# Patient Record
Sex: FEMALE | Race: WHITE
[De-identification: names, ages, dates, MRNs, and addresses within clinical notes are randomized per-mention and may not be internally consistent; named-entity substitution may affect disease eponyms.]

---

## 2019-07-12 ENCOUNTER — HOSPITAL ENCOUNTER (EMERGENCY)
Dept: HOSPITAL 76 - ED | Age: 34
Discharge: HOME | End: 2019-07-12
Payer: COMMERCIAL

## 2019-07-12 VITALS — DIASTOLIC BLOOD PRESSURE: 87 MMHG | SYSTOLIC BLOOD PRESSURE: 136 MMHG

## 2019-07-12 DIAGNOSIS — S46.011A: Primary | ICD-10-CM
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
## 2019-07-12 PROCEDURE — 99283 EMERGENCY DEPT VISIT LOW MDM: CPT

## 2019-07-12 PROCEDURE — 96372 THER/PROPH/DIAG INJ SC/IM: CPT

## 2019-07-12 NOTE — XRAY REPORT
Reason:  right shoulder pain.

Procedure Date:  07/12/2019   

Accession Number:  [id number] / [id number]                    

Procedure:  XR  - Shoulder 3 View RT CPT Code:  

 

FULL RESULT:

 

 

EXAM:

RIGHT SHOULDER RADIOGRAPHY

 

EXAM DATE: 7/12/2019 12:10 PM.

 

CLINICAL HISTORY: Right shoulder pain. History of rotator cuff injury.

 

COMPARISON: None available.

 

TECHNIQUE: 3 views.

 

FINDINGS:

Bones: Normal. No fracture or bone lesion.

 

Joints: The glenohumeral and acromioclavicular joints are normal.

 

Soft tissues: The visualized hemithorax is unremarkable. No soft tissue 

swelling.

IMPRESSION: Negative right shoulder.

 

[facility]

## 2019-07-12 NOTE — ED PHYSICIAN DOCUMENTATION
PD HPI UPPER EXT INJURY





- Stated complaint


Stated Complaint: R SHOULDER PX





- Chief complaint


Chief Complaint: Ext Problem





- History obtained from


History obtained from: Patient





- History of Present Illness


Location: Right, Shoulder


Timing - onset: How many weeks ago (1)


Worsened by: Moving


Similar symptoms before: Diagnosis (Rotator cuff tear)





- Additonal information


Additional information: 


The patient is a 34-year-old female with a history of right rotator cuff tear, 

who presents with right shoulder pain that started 1 week ago when she was 

painting over her head.  She is right-hand dominant.  She has had similar 

symptoms intermittently for the past 2 years.  She denies neck pain, numbness or

weakness.








Review of Systems


Constitutional: denies: Fever


Nose: denies: Congestion


Throat: denies: Sore throat


Cardiac: denies: Chest pain / pressure


Respiratory: denies: Dyspnea, Cough


Skin: denies: Rash


Musculoskeletal: reports: Joint pain (right shoulder).  denies: Neck pain


Neurologic: denies: Focal weakness, Numbness





PD PAST MEDICAL HISTORY





- Past Medical History


Cardiovascular: None


Respiratory: None


Neuro: None


Endocrine/Autoimmune: None


GI: None


GYN: None


: None


HEENT: None


Psych: None


Musculoskeletal: Other (right rotator cuff tear)


Derm: None





- Past Surgical History


Past Surgical History: No





- Present Medications


Home Medications: 


                                Ambulatory Orders











 Medication  Instructions  Recorded  Confirmed


 


Ibuprofen 600 mg PO Q6HR PRN #30 tablet 01/17/16 


 


predniSONE [Deltasone] 40 mg PO DAILY 5 Days  tablet 01/17/16 


 


traMADol [Ultram] 50 mg PO Q4-6H PRN #15 tablet 01/17/16 


 


Tramadol HCl 50 mg PO Q6HR PRN #20 tablet 11/27/18 


 


Ibuprofen [Ibu] 800 mg PO BID #30 tablet 07/12/19 














- Allergies


Allergies/Adverse Reactions: 


                                    Allergies











Allergy/AdvReac Type Severity Reaction Status Date / Time


 


clarithromycin [From Biaxin] Allergy  Unknown Verified 07/12/19 11:40


 


Penicillins Allergy  Unknown Verified 07/12/19 11:40


 


morphine AdvReac  Unknown Verified 07/12/19 11:40














- Social History


Does the pt smoke?: No


Smoking Status: Never smoker


Does the pt drink ETOH?: No


Does the pt have substance abuse?: No





- Immunizations


Immunizations are current?: Yes





- POLST


Patient has POLST: No





PD ED PE NORMAL





- Vitals


Vital signs reviewed: Yes (normal)





- General


General: Alert and oriented X 3, Well developed/nourished





- HEENT


HEENT: Atraumatic





- Neck


Neck: No bony TTP





- Cardiac


Cardiac: RRR





- Respiratory


Respiratory: No respiratory distress





- Back


Back: No spinal TTP





- Derm


Derm: No rash





- Extremities


Extremities: Other (There is tenderness to palpation over the posterior superior

aspect of the right shoulder just posterior to the acromioclavicular joint.  The

pain is exacerbated with elevation of her right arm.  She tolerates passive 

range of motion better than active range of motion.  Distal neurovascular is 

intact.)





- Neuro


Neuro: Alert and oriented X 3, No motor deficit, No sensory deficit





Results





- Vitals


Vitals: 


                               Vital Signs - 24 hr











  07/12/19





  11:37


 


Temperature 36.4 C L


 


Heart Rate 70


 


Respiratory 16





Rate 


 


Blood Pressure 136/87 H


 


O2 Saturation 96








                                     Oxygen











O2 Source                      Room air

















- Rads (name of study)


  ** Right shoulder


Radiology: Prelim report reviewed, EMP read contemporaneously, See rad report 

(Normal right shoulder radiography.)





PD MEDICAL DECISION MAKING





- ED course


Complexity details: reviewed results, re-evaluated patient, considered 

differential, d/w patient


ED course: 


The patient's presentation is most consistent with right rotator cuff tear.  X-

ray of the right shoulder reveals no acute bony abnormality.


Treatment in the emergency department included administration of Toradol 60 mg 

IM and application of a right arm sling.  She is being discharged with 

prescription for ibuprofen.  I discussed with her symptomatic treatment, 

orthopedic follow-up, as well as potentially worrisome signs or symptoms that 

should prompt reevaluation in the emergency department.








Departure





- Departure


Disposition: 01 Home, Self Care


Clinical Impression: 


Right rotator cuff tear


Qualifiers:


 Rotator cuff tear extent: unspecified tear extent Rotator cuff tear trauma 

status: traumatic Encounter type: initial encounter Qualified Code(s): S46.011A 

- Strain of muscle(s) and tendon(s) of the rotator cuff of right shoulder, 

initial encounter





Condition: Stable


Instructions:  ED Torn Rotator Cuff


Follow-Up: 


[facility] [place] [Provider Group]


[facility] [Provider Group]


Prescriptions: 


Ibuprofen [Ibu] 800 mg PO BID #30 tablet


Comments: 


Apply ice pack to your right shoulder intermittently for the next 4 days.


Use the sling for comfort.


You can use ibuprofen, up to 800 mg 3 times daily for anti-inflammatory effect.


Follow-up with your primary physician within 1 week.  Call to schedule an 

appointment.


Return to the emergency department if you develop increasing pain, numbness or 

weakness, or otherwise worsening symptoms.


Forms:  Activity restrictions


Discharge Date/Time: 07/12/19 12:55

## 2019-07-13 ENCOUNTER — HOSPITAL ENCOUNTER (EMERGENCY)
Dept: HOSPITAL 76 - ED | Age: 34
Discharge: HOME | End: 2019-07-13
Payer: COMMERCIAL

## 2019-07-13 VITALS — SYSTOLIC BLOOD PRESSURE: 136 MMHG | DIASTOLIC BLOOD PRESSURE: 83 MMHG

## 2019-07-13 DIAGNOSIS — M75.101: Primary | ICD-10-CM

## 2019-07-13 PROCEDURE — 99283 EMERGENCY DEPT VISIT LOW MDM: CPT

## 2019-07-13 PROCEDURE — 96372 THER/PROPH/DIAG INJ SC/IM: CPT

## 2019-09-19 ENCOUNTER — HOSPITAL ENCOUNTER (OUTPATIENT)
Dept: HOSPITAL 76 - DI | Age: 34
Discharge: HOME | End: 2019-09-19
Attending: GENERAL PRACTICE
Payer: COMMERCIAL

## 2019-09-19 DIAGNOSIS — R20.2: ICD-10-CM

## 2019-09-19 DIAGNOSIS — M25.511: Primary | ICD-10-CM

## 2019-09-19 PROCEDURE — 72141 MRI NECK SPINE W/O DYE: CPT

## 2019-09-20 ENCOUNTER — HOSPITAL ENCOUNTER (OUTPATIENT)
Dept: HOSPITAL 76 - DI | Age: 34
Discharge: HOME | End: 2019-09-20
Attending: GENERAL PRACTICE
Payer: COMMERCIAL

## 2019-09-20 DIAGNOSIS — M75.101: ICD-10-CM

## 2019-09-20 DIAGNOSIS — M75.51: Primary | ICD-10-CM

## 2019-09-20 PROCEDURE — 23350 INJECTION FOR SHOULDER X-RAY: CPT

## 2019-09-20 PROCEDURE — 77002 NEEDLE LOCALIZATION BY XRAY: CPT

## 2019-09-20 PROCEDURE — 73222 MRI JOINT UPR EXTREM W/DYE: CPT

## 2019-09-20 NOTE — XRAY REPORT
Reason:  PAIN IN UNSPECIFIED SHOULDER, PARESTHESIA OF SKIN

Procedure Date:  09/20/2019   

Accession Number:  [id number] / [id number]                    

Procedure:  FL  - Arthrogram Needle Placement CPT Code:  

 

FULL RESULT:

 

 

EXAM:

RIGHT SHOULDER ARTHROGRAPHIC INJECTION WITH FLUOROSCOPIC GUIDANCE

 

EXAM DATE: 9/20/2019 02:56 PM.

 

CLINICAL HISTORY: Right shoulder pain

 

COMPARISON: SHOULDER 3 VIEW RT 07/12/2019 11:48 AM.

 

TECHNIQUE: The risks, benefits, and alternatives of the procedure were 

discussed with the patient. All questions were answered. Written and 

verbal consent were obtained. The right glenohumeral joint was marked 

under fluoroscopy and prepped and draped in a sterile manner. Local 

anesthesia was performed with 1% lidocaine. A 22-gauge needle was then 

inserted into the glenohumeral joint. 10 mL of a solution containing 25% 

1% lidocaine, 25% iodinated contrast, and a 1:200 dilution of gadolinium 

contrast in sterile saline was then injected. The needle was removed 

without immediate complication.

Other: None.

Fluoroscopy Time: 1 minute 23 seconds.

Number of Images: 4.

 

FINDINGS:

Bones and joints: No fracture or subluxation.

 

Injection: Fluoroscopic images demonstrate needle placement and contrast 

in the right glenohumeral joint. No contrast extravasation outside of the 

glenohumeral joint.

IMPRESSION: Successful fluoroscopically guided arthrographic injection of 

the right shoulder.

 

[facility]

## 2019-09-20 NOTE — MRI REPORT
Reason:  PAIN IN UNSPECIFIED SHOULDER, PARESTHESIA OF SKIN

Procedure Date:  09/19/2019   

Accession Number:  [id number] / [id number]                    

Procedure:  MRI - Cervical Spine W/O CPT Code:  

 

FULL RESULT:

 

 

EXAM:

MRI CERVICAL SPINE WITHOUT CONTRAST

 

EXAM DATE: 9/19/2019 01:47 PM.

 

CLINICAL HISTORY: 34-year-old female. PAIN IN UNSPECIFIED SHOULDER, 

PARESTHESIA OF SKIN.

 

COMPARISONS: SHOULDER 3 VIEW RT 07/12/2019 11:48 AM.

 

TECHNIQUE: Multiplanar, multisequence T1-weighted and fluid-sensitive 

sequences of the cervical spine without contrast. Other: None.

 

FINDINGS:

Neurologic Structures: The visualized posterior fossa structures are 

unremarkable. No signal abnormality in the visualized spinal cord.

 

Alignment: No scoliosis or spondylolisthesis.

 

Bone Marrow: No gross fractures or bone lesions. No marrow edema.

 

Interspace Levels/Facets:

C1-C2: Unremarkable.

 

C2-C3: Unremarkable.

 

C3-C4: Unremarkable.

 

C4-C5: Unremarkable.

 

C5-C6: Unremarkable.

 

C6-C7: Unremarkable.

 

C7-T1: Unremarkable.

 

Musculature: Normal. No edema or fatty atrophy.

 

Other: The paravertebral and prevertebral soft tissues are normal.

IMPRESSION: Unremarkable cervical spine MRI.

 

[facility]

## 2019-09-21 NOTE — MRI REPORT
Reason:  PAIN IN UNSPECIFIED SHOULDER, PARESTHESIA OF SKIN

Procedure Date:  09/20/2019   

Accession Number:  [id number] / [id number]                    

Procedure:  MRI - Arthrogram Shoulder RT CPT Code:  

 

FULL RESULT:

 

 

EXAM:

RIGHT SHOULDER MRI ARTHROGRAM WITH CONTRAST

 

EXAM DATE: 9/20/2019 03:17 PM.

 

CLINICAL HISTORY: Shoulder pain. Skin paresthesias. Decreased range of 

motion and  injury in 2000.

 

COMPARISON: Radiograph 07/12/2019.

 

TECHNIQUE: Multiplanar, multisequence T1-weighted and fluid-sensitive 

sequences of the shoulder after an arthrographic injection of dilute 

gadolinium, dictated under a separate exam. Other: None.

 

FINDINGS:

Acromioclavicular Region: The acromion is type II. The acromioclavicular 

joint is unremarkable. The coracoacromial and coracoclavicular ligaments 

are intact. A small amount of contrast in the subacromial/subdeltoid 

bursa indicates a full-thickness rotator cuff tear.

 

Glenohumeral Region: No subluxation. No loose bodies. The articular 

cartilage is unremarkable. The glenohumeral ligaments and joint capsule 

are unremarkable.

 

Bone Marrow: No fracture, marrow edema or bone lesions.

 

Labrum: The labrum is unremarkable.

 

Biceps Tendon: The long head of the biceps tendon and biceps pulley are 

intact.

 

Musculature/Rotator Cuff: The subscapularis tendon is unremarkable. The 

supraspinatus tendon has an 8 mm wide focus of full-thickness and near 

full-thickness tearing. The length of the tear is 1.1 cm. There is a 

partial-thickness, joint-sided tear of the infraspinatus mid fibers that 

is 9 mm in width, 6 mm in length, and one third in thickness (601/16; 

901/23). The teres minor tendon is intact. No edema or fatty atrophy.

 

Other: The subcutaneous tissues are unremarkable.

IMPRESSION:

1. Mild subacromial/subdeltoid bursitis.

2. Partial with region of full-thickness and near full-thickness tearing 

of the supraspinatus tendon.

3. Partial width, partial-thickness tear of the infraspinatus tendon.

 

[facility]

## 2019-12-26 ENCOUNTER — HOSPITAL ENCOUNTER (OUTPATIENT)
Dept: HOSPITAL 76 - DI | Age: 34
Discharge: HOME | End: 2019-12-26
Attending: ORTHOPAEDIC SURGERY
Payer: COMMERCIAL

## 2019-12-26 DIAGNOSIS — S43.431A: Primary | ICD-10-CM

## 2019-12-26 DIAGNOSIS — M67.88: ICD-10-CM

## 2019-12-26 PROCEDURE — 20550 NJX 1 TENDON SHEATH/LIGAMENT: CPT

## 2019-12-26 NOTE — ULTRASOUND REPORT
Reason:  RT SHOULDER SUPERIOR GLENOID LABRUM LESION

Procedure Date:  12/26/2019   

Accession Number:  [id number] / [id number]                    

Procedure:  US  - Injection Single Tendon CPT Code:  79624

 

***Final Report***

 

 

FULL RESULT:

 

 

EXAM:

ULTRASOUND GUIDED BICEPS TENDON SHEATH PAIN INJECTION.

 

EXAM DATE: 12/26/2019 01:57 PM.

 

CLINICAL HISTORY: Right shoulder superior glenoid labrum lesion. Right 

shoulder pain.

 

COMPARISON: None.

 

TECHNIQUE: Real-time and static images were obtained.

 

FINDINGS: Following written and informed consent, the right shoulder and 

upper arm region was sterilely prepped and draped in the usual fashion. 

Under real-time live sonographic guidance a 25-gauge needle was advanced 

to the biceps tendon sheath and 0.5% ropivacaine 5 mL were injected into 

the tendon sheath. The needle was withdrawn. The patient appeared to 

tolerate the procedure well. A bandage was applied.

IMPRESSION:

Uncomplicated anesthetic injection into the right biceps tendon sheath.

 

[facility]

## 2020-01-03 ENCOUNTER — HOSPITAL ENCOUNTER (OUTPATIENT)
Dept: HOSPITAL 76 - DI | Age: 35
Discharge: HOME | End: 2020-01-03
Attending: STUDENT IN AN ORGANIZED HEALTH CARE EDUCATION/TRAINING PROGRAM
Payer: COMMERCIAL

## 2020-01-03 DIAGNOSIS — M35.7: Primary | ICD-10-CM

## 2020-01-03 PROCEDURE — 93306 TTE W/DOPPLER COMPLETE: CPT

## 2020-01-16 ENCOUNTER — HOSPITAL ENCOUNTER (EMERGENCY)
Dept: HOSPITAL 76 - ED | Age: 35
Discharge: HOME | End: 2020-01-16
Payer: COMMERCIAL

## 2020-01-16 VITALS — SYSTOLIC BLOOD PRESSURE: 135 MMHG | DIASTOLIC BLOOD PRESSURE: 79 MMHG

## 2020-01-16 DIAGNOSIS — Z02.3: Primary | ICD-10-CM

## 2020-01-16 PROCEDURE — 99281 EMR DPT VST MAYX REQ PHY/QHP: CPT

## 2020-01-16 NOTE — ED PHYSICIAN DOCUMENTATION
History of Present Illness





- Stated complaint


Stated Complaint: COMMISSION PHYSICAL





- Chief complaint


Chief Complaint: General





- History obtained from


History obtained from: Patient





- History of Present Illness


Timing: Other (she needs commission physical done for forms; had appt 3 days ago

for it but [facility] closed due to weather, and was still closed all week. She

needs it by end of day today or can't apply for commission.)





Review of Systems


Constitutional: denies: Fever


Nose: denies: Rhinorrhea / runny nose, Congestion


Throat: denies: Sore throat


Respiratory: denies: Cough


Neurologic: denies: Near syncope, Syncope


Endocrine: denies: Weight loss





PD PAST MEDICAL HISTORY





- Past Medical History


Cardiovascular: None


Respiratory: None


Neuro: None


Endocrine/Autoimmune: None


GI: None


GYN: None


: None


HEENT: None


Psych: None


Musculoskeletal: Other


Derm: None





- Past Surgical History


Past Surgical History: No





- Present Medications


Home Medications: 


                                Ambulatory Orders











 Medication  Instructions  Recorded  Confirmed


 


Ibuprofen 600 mg PO Q6HR PRN #30 tablet 01/17/16 


 


predniSONE [Deltasone] 40 mg PO DAILY 5 Days  tablet 01/17/16 


 


traMADol [Ultram] 50 mg PO Q4-6H PRN #15 tablet 01/17/16 


 


Tramadol HCl 50 mg PO Q6HR PRN #20 tablet 11/27/18 


 


Ibuprofen [Ibu] 800 mg PO BID #30 tablet 07/12/19 


 


predniSONE [Deltasone] 20 mg PO [id number] #21 tab 07/13/19 


 


traMADol [Ultram] 50 mg PO Q4-6H PRN #15 tablet 07/13/19 














- Allergies


Allergies/Adverse Reactions: 


                                    Allergies











Allergy/AdvReac Type Severity Reaction Status Date / Time


 


clarithromycin [From Biaxin] Allergy  Unknown Verified 01/16/20 08:00


 


Penicillins Allergy  Unknown Verified 01/16/20 08:00


 


morphine AdvReac  Unknown Verified 01/16/20 08:00














- Social History


Does the pt smoke?: No


Smoking Status: Never smoker


Does the pt drink ETOH?: No


Does the pt have substance abuse?: No





- Immunizations


Immunizations are current?: Yes





- POLST


Patient has POLST: No





PD ED PE NORMAL





- Vitals


Vital signs reviewed: Yes





- General


General: Alert and oriented X 3, No acute distress, Well developed/nourished





- HEENT


HEENT: Pharynx benign





- Neck


Neck: Supple, no meningeal sign, No adenopathy, Thyroid normal





- Cardiac


Cardiac: RRR, No murmur





- Respiratory


Respiratory: Clear bilaterally





- Abdomen


Abdomen: Soft, Non tender, No organomegaly





- Derm


Derm: Normal color, Warm and dry





- Extremities


Extremities: Normal ROM s pain





- Neuro


Neuro: Alert and oriented X 3, No motor deficit, Normal speech





Results





- Vitals


Vitals: 


                                     Oxygen











O2 Source                      Room air

















PD MEDICAL DECISION MAKING





- ED course


Complexity details: considered differential (not usual ER patient, but she had 

tried to get regular appt but [facility] closed all week due to weather. So I felt it 

reasonable to do this. Her physical exam form was filled out and sent with her. 

No abnormalities. Normal exam. It was govt form 2808. ), d/w patient





Departure





- Departure


Disposition: 01 Home, Self Care


Clinical Impression: 


 Physical exam





Condition: Stable


Record reviewed to determine appropriate education?: Yes


Follow-Up: 


GUTIERREZ LYLES MD [Primary Care Provider] - 


Discharge Date/Time: 01/16/20 10:03

## 2020-08-26 ENCOUNTER — HOSPITAL ENCOUNTER (EMERGENCY)
Dept: HOSPITAL 76 - ED | Age: 35
Discharge: HOME | End: 2020-08-26
Payer: COMMERCIAL

## 2020-08-26 VITALS — DIASTOLIC BLOOD PRESSURE: 82 MMHG | SYSTOLIC BLOOD PRESSURE: 126 MMHG

## 2020-08-26 DIAGNOSIS — N30.01: Primary | ICD-10-CM

## 2020-08-26 LAB
CLARITY UR REFRACT.AUTO: CLEAR
GLUCOSE UR QL STRIP.AUTO: NEGATIVE MG/DL
HCG UR QL: NEGATIVE
KETONES UR QL STRIP.AUTO: NEGATIVE MG/DL
NITRITE UR QL STRIP.AUTO: NEGATIVE
PH UR STRIP.AUTO: 6 PH (ref 5–7.5)
PROT UR STRIP.AUTO-MCNC: NEGATIVE MG/DL
RBC # UR STRIP.AUTO: (no result) /UL
RBC # URNS HPF: (no result) /HPF (ref 0–5)
SP GR UR STRIP.AUTO: <1.005 (ref 1–1.03)
SP GR UR STRIP.AUTO: <=1.005 (ref 1–1.03)
SQUAMOUS URNS QL MICRO: (no result)
UROBILINOGEN UR QL STRIP.AUTO: (no result) E.U./DL
UROBILINOGEN UR STRIP.AUTO-MCNC: NEGATIVE MG/DL
WBC CLUMPS URNS QL MICRO: PRESENT

## 2020-08-26 PROCEDURE — 96372 THER/PROPH/DIAG INJ SC/IM: CPT

## 2020-08-26 PROCEDURE — 87077 CULTURE AEROBIC IDENTIFY: CPT

## 2020-08-26 PROCEDURE — 81001 URINALYSIS AUTO W/SCOPE: CPT

## 2020-08-26 PROCEDURE — 99284 EMERGENCY DEPT VISIT MOD MDM: CPT

## 2020-08-26 PROCEDURE — 99283 EMERGENCY DEPT VISIT LOW MDM: CPT

## 2020-08-26 PROCEDURE — 87086 URINE CULTURE/COLONY COUNT: CPT

## 2020-08-26 PROCEDURE — 81025 URINE PREGNANCY TEST: CPT

## 2020-08-26 PROCEDURE — 87181 SC STD AGAR DILUTION PER AGT: CPT

## 2020-08-26 PROCEDURE — 81003 URINALYSIS AUTO W/O SCOPE: CPT

## 2021-10-13 NOTE — ED PHYSICIAN DOCUMENTATION
Chief Complaint  Establish Care (new pt - nonfasting - rheum, allergy, derm) and Cough (pt had negative cough, requesting inhaler )  Masks/face shield/appropriate PPE were worn for the entirety of the visit by the patient, MA, and provider.     Subjective          Izzy Talbert presents to [facility]  History of Present Illness  Izzy Talbert is a 59 y.o. female who presents today to [facility]. Patient was previously followed by a [facility] [place] [place] provider.  Patient's last annual exam was on 5/12/2021.  Patient has a past medical history significant for hyperlipidemia, hypertension, hypothyroidism, spondyloarthropathy/ankylosing spondylitis, and ulcerative colitis.     Medical History:   · Hyperlipidemia: Chronic.  Patient's last lipid panel was checked in February 2021.  She is currently taking simvastatin 20 mg p.o. daily.  · Hypertension: Chronic.  Patient is currently taking amloride, carvedilol, and lisinopril. She is asymptomatic and denies headache, shortness of breath, and chest pain. She does not usually check her blood pressure at home, but when she does, they are typically around 120/80.  · Hypothyroidism: Chronic.  Patient last had thyroid hormone labs done on 2/19/2021 and were normal.  Patient is currently taking levothyroxine 75 mcg p.o. daily.   · Spondyloarthropathy/ankylosing spondylitis: Chronic.  Patient follows with rheumatology and orthopedics.  She is currently on treatment with methotrexate injection weekly and golimumab.  · Ulcerative colitis: She follows with gastroenterology, Dr. Fiore, once a year. She is doing well and denies any symptoms of ulcerative colitis.     Complaints today:   · Cough: Patient states she had third COVID vaccine on 9/25. She then went to a wedding the first weekend of October. The week after he wedding, she started developing rhinorrhea, congestion, and cough. She was tested for COVID-19 and was negative and felt better 4 days after he  PD HPI UPPER EXT INJURY





- Stated complaint


Stated Complaint: R HAND NUMBNESS/NECK PX





- Chief complaint


Chief Complaint: Ext Problem





- History obtained from


History obtained from: Patient





- History of Present Illness


Location: Right (injured Shoulder 4 years ago, since then has had intermittent 

problems.  More recently has had a lot of popping and was seen yesterday with 

negative x-rays.  Pain is uncontrolled and she has some numbness of the right 

hand today as well.)





Review of Systems


Constitutional: reports: Reviewed and negative


Ears: reports: Reviewed and negative


Nose: reports: Reviewed and negative


Throat: reports: Reviewed and negative





PD PAST MEDICAL HISTORY





- Past Medical History


Cardiovascular: None


Respiratory: None


Neuro: None


Endocrine/Autoimmune: None


GI: None


GYN: None


: None


HEENT: None


Psych: None


Musculoskeletal: Other


Derm: None





- Past Surgical History


Past Surgical History: No





- Present Medications


Home Medications: 


                                Ambulatory Orders











 Medication  Instructions  Recorded  Confirmed


 


RX: Ibuprofen 600 mg PO Q6HR PRN #30 tablet 01/17/16 


 


RX: predniSONE [Deltasone] 40 mg PO DAILY 5 Days  tablet 01/17/16 


 


RX: traMADol [Ultram] 50 mg PO Q4-6H PRN #15 tablet 01/17/16 


 


RX: Tramadol HCl 50 mg PO Q6HR PRN #20 tablet 11/27/18 


 


RX: Ibuprofen [Ibu] 800 mg PO BID #30 tablet 07/12/19 


 


RX: predniSONE [Deltasone] 20 mg PO [id number] #21 tab 07/13/19 


 


traMADol [Ultram] 50 mg PO Q4-6H PRN #15 tablet 07/13/19 














- Allergies


Allergies/Adverse Reactions: 


                                    Allergies











Allergy/AdvReac Type Severity Reaction Status Date / Time


 


clarithromycin [From Biaxin] Allergy  Unknown Verified 07/13/19 17:06


 


Penicillins Allergy  Unknown Verified 07/13/19 17:06


 


morphine AdvReac  Unknown Verified 07/13/19 17:06














- Social History


Does the pt smoke?: No


Smoking Status: Never smoker


Does the pt drink ETOH?: No


Does the pt have substance abuse?: No





- Immunizations


Immunizations are current?: Yes





- POLST


Patient has POLST: No





PD ED PE NORMAL





- Vitals


Vital signs reviewed: Yes





- General


General: Alert and oriented X 3, No acute distress





- Neck


Neck: Supple, no meningeal sign, No bony TTP





- Extremities


Extremities: Other (Right shoulder in a sling and not range since she was damage

yesterday.  She has mild diffuse numbness of the right hand that does not follow

a dermatomal nor peripheral nerve pattern. Normal , interosseous, thumb 

extension, and flexion extension at the wrist strength.)





- Neuro


Neuro: Alert and oriented X 3, Normal speech





Results





- Vitals


Vitals: 


                               Vital Signs - 24 hr











  07/13/19





  17:00


 


Temperature 37 C


 


Heart Rate 63


 


Respiratory 10 L





Rate 


 


Blood Pressure 136/83 H


 


O2 Saturation 100








                                     Oxygen











O2 Source                      Room air

















PD MEDICAL DECISION MAKING





- ED course


ED course: 





The numbness in her hand fits neither a dermatomal nor peripheral nerve pattern.

 It could just be from being in the sling and she was advised to come out of 

that several times a day and do range of motion exercises it could be a cervical

radiculopathy to but it does not quite fit.  She is started on steroids [place] 

[place]





Departure





- Departure


Disposition: 01 Home, Self Care


Clinical Impression: 


 Right rotator cuff tear





Condition: Good


Record reviewed to determine appropriate education?: Yes


Health Concerns: 


shoulder pain


Instructions:  ED Cervical Radiculopathy


Prescriptions: 


RX: predniSONE [Deltasone] 20 mg PO [id number] #21 tab


traMADol [Ultram] 50 mg PO Q4-6H PRN #15 tablet


 PRN Reason: Pain


Comments: 


[place]  Return if worse.  At that 

appointment discuss MRIs of the cervical spine and shoulder.


Forms:  Activity restrictions


Discharge Date/Time: 07/13/19 17:59 "start of her symptoms. She still has some lingering coughing in the morning. She states her cough is not productive.  She denies shortness of breath and fever.  She states in the past when she has had cough like this, she was given an albuterol inhaler and her cough improved.  She does have allergies and takes montelukast, Zyrtec, and Flonase.  She has been previously followed by an allergist.    Review of Systems   Constitutional: Negative.    HENT: Negative.    Eyes: Negative.    Respiratory: Negative.    Cardiovascular: Negative.    Gastrointestinal: Negative.    Endocrine: Negative.    Genitourinary: Negative.    Musculoskeletal: Negative.    Skin: Negative.    Allergic/Immunologic: Negative.    Neurological: Negative.    Hematological: Negative.    Psychiatric/Behavioral: Negative.       Objective   Vital Signs:   /86 (BP Location: Right arm, Patient Position: Sitting)   Pulse 78   Temp 97.3 °F (36.3 °C)   Ht 165.1 cm (65\")   Wt 90.7 kg (200 lb)   SpO2 98%   BMI 33.28 kg/m²     Physical Exam  Vitals reviewed.   Constitutional:       General: She is not in acute distress.     Appearance: Normal appearance. She is well-developed. She is not ill-appearing, toxic-appearing or diaphoretic.   HENT:      Head: Normocephalic and atraumatic.   Eyes:      General: No scleral icterus.        Right eye: No discharge.         Left eye: No discharge.      Extraocular Movements: Extraocular movements intact.   Neck:      Thyroid: No thyroid mass, thyromegaly or thyroid tenderness.   Cardiovascular:      Rate and Rhythm: Normal rate and regular rhythm.      Heart sounds: Normal heart sounds.   Pulmonary:      Effort: Pulmonary effort is normal.      Breath sounds: No stridor. No wheezing, rhonchi or rales.      Comments: Distant breath sounds to lungs bilaterally  Abdominal:      General: Bowel sounds are normal.      Palpations: Abdomen is soft.   Musculoskeletal:         General: Normal range of motion.      " Cervical back: Normal range of motion.      Right lower leg: No edema.      Left lower leg: No edema.   Skin:     General: Skin is warm.   Neurological:      General: No focal deficit present.      Mental Status: She is alert and oriented to person, place, and time.      Motor: No weakness.      Gait: Gait normal.   Psychiatric:         Mood and Affect: Mood normal.         Behavior: Behavior normal.        Result Review :     Common labs    Common Labsle 4/5/21 4/5/21 6/7/21 6/7/21 8/4/21 8/4/21    1239 1239 1121 1121 1553 1553   Albumin  4.40  4.40 4.50    Total Bilirubin  <0.2  <0.2 <0.2    Alkaline Phosphatase  55  60 63    AST (SGOT)  23  23 29    ALT (SGPT)  24  28 32    WBC 7.35  8.55   6.96   Hemoglobin 12.2  11.9 (A)   12.5   Hematocrit 36.8  35.1   36.5   Platelets 292  272   272   (A) Abnormal value           3 Result Notes     1 HM Topic    Component   Ref Range & Units 7 mo ago   (2/19/21) 1 yr ago   (12/10/19) 2 yr ago   (10/4/19) 2 yr ago   (11/29/18) 3 yr ago   (12/5/17) 4 yr ago   (11/30/16)   Total Cholesterol   0 - 200 mg/dL 178  204 High   201 High   187  206 High   203 High     Triglycerides   0 - 150 mg/dL 131  96  170 High   142  120  162 High     HDL Cholesterol   40 - 60 mg/dL 74 High   79 High   66 High   70 High   77 High   72 High     LDL Cholesterol    0 - 100 mg/dL 82  106 High   101 High   89  105 High   99    VLDL Cholesterol   5 - 40 mg/dL 22  19.2 R  34  28.4  24  32.4    LDL/HDL Ratio  1.05   1.53  1.27  1.36  1.37    Resulting Agency  [facility]  [facility]         TSH   0.270 - 4.200 uIU/mL 2.740  1.600  0.484 R  4.760 High   3.250 R      Component   Ref Range & Units 7 mo ago 2 yr ago 3 yr ago 4 yr ago   Free T4   0.93 - 1.70 ng/dL 1.61  1.16  1.22  1.25    Resulting Agency  [facility]  [facility]                  Assessment and Plan    Diagnoses and all orders for this visit:    1. Cough (Primary)  -     albuterol sulfate  (90  Base) MCG/ACT inhaler; Inhale 2 puffs Every 4 (Four) Hours As Needed for Wheezing.  Dispense: 8 g; Refill: 0    2. Primary hypertension    3. Pure hypercholesterolemia      1. Cough: Patient could be experiencing some mild bronchitis related to upper respiratory viral infection or could also be experiencing a cough related to allergies.  She denies productive sputum and fever.  On physical exam, there is no presence of rhonchi, wheezing, or rales.  Overall, her symptoms have improved.  Patient will continue to take Zyrtec, montelukast, and Flonase.  I have also provided patient with an albuterol inhaler to help when she does experience coughing.  I did encourage patient to call if her symptoms worsen, do not improve, or if she develops productive sputum or fever or shortness of breath as a chest x-ray may be warranted.  Her cough could also be related to lisinopril, however she has been on this medication for 3 years.  She plans to wait to receive her influenza vaccine until she is feeling better.  2. Hypertension: Patient's blood pressure is elevated in the office today.  It was also elevated on recheck.  Patient is asymptomatic, but did state she is a little anxious today after going to traffic. Patient was encouraged to check her blood pressure at home daily and bring these readings to her next follow-up appointment.  Patient will also call us next week to let us know what her blood pressures have been.  3. Hypercholesterolemia: Patient's last lipid panel looked excellent.  She will continue on simvastatin 20 mg p.o. daily.  We will plan to recheck lab work at her next appointment.    Follow Up   Return in about 8 weeks (around 12/8/2021) for Recheck, hypertension, hyperlipidemia.   Call if symptoms worsen or do not improve  Patient was given instructions and counseling regarding her condition or for health maintenance advice. Please see specific information pulled into the AVS if appropriate.     Electronically  signed by Francia Johansen, APRN, 10/13/21, 12:38 PM EDT.